# Patient Record
(demographics unavailable — no encounter records)

---

## 2025-07-03 NOTE — REASON FOR VISIT
[Follow-up - Scheduled] : a follow-up, scheduled visit for [Pilonidal disease] : pilonidal disease  [Patient] : patient

## 2025-07-03 NOTE — ASSESSMENT
[FreeTextEntry1] : Nate is a 24yo male with a history of pilonidal disease s/p minimal excision procedure with Dr. Meir Raymundo. He observed a small pustule on his buttock and was concerned for recurrence. On the photos there were no obvious pits or signs of infection. It is my recommendation that he continues to monitor his symptoms and follow up on an as needed basis. Collin verbalized his understanding and is in agreement with the plan. He has my contact information and know to reach out with any further questions or concerns.

## 2025-07-03 NOTE — HISTORY OF PRESENT ILLNESS
[FreeTextEntry1] : Nate is a 24yo male with a history of pilonidal disease s/p in-office minimal excision in March 2024. He presents today reporting concerns of a new abscess. He denies pain and drainage. Photos sent via email for review.